# Patient Record
Sex: FEMALE | Race: BLACK OR AFRICAN AMERICAN | NOT HISPANIC OR LATINO | URBAN - METROPOLITAN AREA
[De-identification: names, ages, dates, MRNs, and addresses within clinical notes are randomized per-mention and may not be internally consistent; named-entity substitution may affect disease eponyms.]

---

## 2017-04-07 ENCOUNTER — EMERGENCY (EMERGENCY)
Facility: HOSPITAL | Age: 72
LOS: 1 days | Discharge: HOME | End: 2017-04-07
Attending: INTERNAL MEDICINE | Admitting: EMERGENCY MEDICINE
Payer: COMMERCIAL

## 2017-04-07 VITALS
HEIGHT: 63 IN | HEART RATE: 80 BPM | DIASTOLIC BLOOD PRESSURE: 76 MMHG | TEMPERATURE: 98 F | SYSTOLIC BLOOD PRESSURE: 162 MMHG | OXYGEN SATURATION: 98 % | RESPIRATION RATE: 16 BRPM | WEIGHT: 134.48 LBS

## 2017-04-07 VITALS
OXYGEN SATURATION: 99 % | RESPIRATION RATE: 20 BRPM | TEMPERATURE: 98 F | DIASTOLIC BLOOD PRESSURE: 84 MMHG | HEART RATE: 74 BPM | SYSTOLIC BLOOD PRESSURE: 142 MMHG

## 2017-04-07 PROCEDURE — 99283 EMERGENCY DEPT VISIT LOW MDM: CPT | Mod: 25

## 2017-04-07 PROCEDURE — 72040 X-RAY EXAM NECK SPINE 2-3 VW: CPT | Mod: 26

## 2017-04-07 PROCEDURE — 72040 X-RAY EXAM NECK SPINE 2-3 VW: CPT

## 2017-04-07 PROCEDURE — 99283 EMERGENCY DEPT VISIT LOW MDM: CPT

## 2017-04-07 RX ORDER — IBUPROFEN 200 MG
600 TABLET ORAL ONCE
Qty: 0 | Refills: 0 | Status: COMPLETED | OUTPATIENT
Start: 2017-04-07 | End: 2017-04-07

## 2017-04-07 RX ADMIN — Medication 600 MILLIGRAM(S): at 13:46

## 2017-04-07 RX ADMIN — Medication 600 MILLIGRAM(S): at 13:21

## 2017-04-07 NOTE — DISCHARGE NOTE ADULT - CARE PLAN
Goal:	warm/cold compress follow up with PCP  Instructions for follow-up, activity and diet:	no heavy lifting

## 2017-04-07 NOTE — ED PROVIDER NOTE - MEDICAL DECISION MAKING DETAILS
xray with advanced degenerative changes of cspine...prob radiculopathy...advised heat, nsaid and pmd f/u.may need mri in future.

## 2017-04-07 NOTE — DISCHARGE NOTE ADULT - PATIENT PORTAL LINK FT
“You can access the [facility] Patient Portal, offered by [facility], by registering with the following website: [email or web address]”

## 2017-04-07 NOTE — ED PROVIDER NOTE - DETAILS:
Mushtaq Mcgregor MD - The scribe's documentation has been prepared under my direction and personally reviewed by me in its entirety. I confirm that the note above accurately reflects all work, treatment, procedures, and medical decision making performed by me.

## 2017-04-07 NOTE — ED ADULT NURSE NOTE - FAMILY HISTORY
Father  Still living? Unknown  Family history of hypertension, Age at diagnosis: Age Unknown     Mother  Still living? Unknown  Family history of hypertension, Age at diagnosis: Age Unknown  Family history of glaucoma, Age at diagnosis: Age Unknown     Grandparent  Still living? Unknown  Family history of hypertension, Age at diagnosis: Age Unknown  Family history of glaucoma, Age at diagnosis: Age Unknown

## 2017-04-07 NOTE — ED ADULT NURSE NOTE - OBJECTIVE STATEMENT
71Y female BID by  in personal car, pt c/o "my right side neck hurts and travels to my head", pt denies any trauma, pain scale 8/10 during rest, 10/10 on excretion. pt is noted as sharp. pt has positive range/motion. no deformity's noted.

## 2017-04-07 NOTE — ED PROVIDER NOTE - OBJECTIVE STATEMENT
70 y/o F pt with PMHx of HTN, high cholesterol, chronic back pain, and headache secondary to sinus pressure presents to the ED c/o posterior neck pain, radiating to right shoulder and back x 1 week. Pt reports taking Aleve with transient relief. Denies fever, vomiting, or parasthenia. 70 y/o F pt with PMHx of HTN, high cholesterol, chronic back pain, and headache secondary to ?sinus disease presents to the ED c/o posterior neck pain, radiating to right shoulder and back x 1 week. Pt reports taking Aleve with transient relief. Denies fever, parasthesias or weakness.

## 2017-04-07 NOTE — ED PROVIDER NOTE - NS ED MD SCRIBE ATTENDING SCRIBE SECTIONS
OBSERVATION MONITORING PLAN/REVIEW OF SYSTEMS/DISPOSITION/PHYSICAL EXAM/VITAL SIGNS( Pullset)/PAST MEDICAL/SURGICAL/SOCIAL HISTORY

## 2017-04-27 ENCOUNTER — OUTPATIENT (OUTPATIENT)
Dept: OUTPATIENT SERVICES | Facility: HOSPITAL | Age: 72
LOS: 1 days | End: 2017-04-27
Payer: COMMERCIAL

## 2017-04-27 DIAGNOSIS — Z00.8 ENCOUNTER FOR OTHER GENERAL EXAMINATION: ICD-10-CM

## 2017-04-27 PROBLEM — Z00.00 ENCOUNTER FOR PREVENTIVE HEALTH EXAMINATION: Status: ACTIVE | Noted: 2017-04-27

## 2017-04-27 PROCEDURE — 70551 MRI BRAIN STEM W/O DYE: CPT

## 2017-05-05 ENCOUNTER — APPOINTMENT (OUTPATIENT)
Dept: MRI IMAGING | Facility: CLINIC | Age: 72
End: 2017-05-05

## 2017-05-20 NOTE — ED PROVIDER NOTE - NS ED MD DISPO DISCHARGE CCDA
History of renal stent  prior insertion in Oct 2014 and exchanged in   right side - Jan 17th , 2015  S/P cystoscopy  Oct 2014  Status post spinal disc removal  C-5 C-6 removal Patient/Caregiver provided printed discharge information.

## 2018-08-14 ENCOUNTER — APPOINTMENT (OUTPATIENT)
Dept: MAMMOGRAPHY | Facility: CLINIC | Age: 73
End: 2018-08-14

## 2018-08-21 PROBLEM — E78.5 HYPERLIPIDEMIA, UNSPECIFIED: Chronic | Status: ACTIVE | Noted: 2017-04-07

## 2018-08-21 PROBLEM — I10 ESSENTIAL (PRIMARY) HYPERTENSION: Chronic | Status: ACTIVE | Noted: 2017-04-07

## 2018-08-21 PROBLEM — M54.9 DORSALGIA, UNSPECIFIED: Chronic | Status: ACTIVE | Noted: 2017-04-07

## 2018-08-30 ENCOUNTER — RESULT REVIEW (OUTPATIENT)
Age: 73
End: 2018-08-30

## 2018-08-30 ENCOUNTER — OUTPATIENT (OUTPATIENT)
Dept: OUTPATIENT SERVICES | Facility: HOSPITAL | Age: 73
LOS: 1 days | End: 2018-08-30
Payer: COMMERCIAL

## 2018-08-30 ENCOUNTER — APPOINTMENT (OUTPATIENT)
Dept: MAMMOGRAPHY | Facility: CLINIC | Age: 73
End: 2018-08-30
Payer: SELF-PAY

## 2018-08-30 DIAGNOSIS — Z00.8 ENCOUNTER FOR OTHER GENERAL EXAMINATION: ICD-10-CM

## 2018-08-30 PROCEDURE — A4648: CPT

## 2018-08-30 PROCEDURE — 77065 DX MAMMO INCL CAD UNI: CPT

## 2018-08-30 PROCEDURE — 19081 BX BREAST 1ST LESION STRTCTC: CPT | Mod: RT

## 2018-08-30 PROCEDURE — 19081 BX BREAST 1ST LESION STRTCTC: CPT

## 2018-08-30 PROCEDURE — 77065 DX MAMMO INCL CAD UNI: CPT | Mod: 26,RT

## 2019-09-18 ENCOUNTER — APPOINTMENT (OUTPATIENT)
Dept: UROLOGY | Facility: CLINIC | Age: 74
End: 2019-09-18
Payer: MEDICARE

## 2019-09-18 VITALS
RESPIRATION RATE: 13 BRPM | OXYGEN SATURATION: 89 % | DIASTOLIC BLOOD PRESSURE: 90 MMHG | BODY MASS INDEX: 23.04 KG/M2 | SYSTOLIC BLOOD PRESSURE: 150 MMHG | WEIGHT: 130 LBS | HEIGHT: 63 IN | HEART RATE: 69 BPM

## 2019-09-18 DIAGNOSIS — Z86.79 PERSONAL HISTORY OF OTHER DISEASES OF THE CIRCULATORY SYSTEM: ICD-10-CM

## 2019-09-18 DIAGNOSIS — Z78.9 OTHER SPECIFIED HEALTH STATUS: ICD-10-CM

## 2019-09-18 DIAGNOSIS — Z86.39 PERSONAL HISTORY OF OTHER ENDOCRINE, NUTRITIONAL AND METABOLIC DISEASE: ICD-10-CM

## 2019-09-18 PROCEDURE — 99203 OFFICE O/P NEW LOW 30 MIN: CPT

## 2019-09-18 RX ORDER — IRON/IRON ASP GLY/FA/MV-MIN 38 125-25-1MG
TABLET ORAL
Refills: 0 | Status: ACTIVE | COMMUNITY

## 2019-09-18 RX ORDER — CALCIUM CARBONATE/VITAMIN D3 600 MG-10
TABLET ORAL
Refills: 0 | Status: ACTIVE | COMMUNITY

## 2019-09-18 RX ORDER — LISINOPRIL 30 MG/1
TABLET ORAL
Refills: 0 | Status: ACTIVE | COMMUNITY

## 2019-09-18 RX ORDER — LOSARTAN POTASSIUM 100 MG/1
TABLET, FILM COATED ORAL
Refills: 0 | Status: ACTIVE | COMMUNITY

## 2019-09-18 NOTE — PHYSICAL EXAM
[General Appearance - Well Nourished] : well nourished [General Appearance - Well Developed] : well developed [Normal Appearance] : normal appearance [Well Groomed] : well groomed [Edema] : no peripheral edema [General Appearance - In No Acute Distress] : no acute distress [Respiration, Rhythm And Depth] : normal respiratory rhythm and effort [Abdomen Soft] : soft [Abdomen Tenderness] : non-tender [Exaggerated Use Of Accessory Muscles For Inspiration] : no accessory muscle use [Normal Station and Gait] : the gait and station were normal for the patient's age [Costovertebral Angle Tenderness] : no ~M costovertebral angle tenderness [] : no rash [No Focal Deficits] : no focal deficits [Oriented To Time, Place, And Person] : oriented to person, place, and time [Affect] : the affect was normal [Mood] : the mood was normal [Not Anxious] : not anxious [Inguinal Lymph Nodes Enlarged Bilaterally] : inguinal

## 2019-09-18 NOTE — REVIEW OF SYSTEMS
[Feeling Tired] : feeling tired [Dry Eyes] : dryness of the eyes [Date of last menstrual period ____] : date of last menstrual period: [unfilled] [Joint Pain] : joint pain [Wake up at night to urinate  How many times?  ___] : wakes up to urinate [unfilled] times during the night [Hot Flashes] : hot flashes [Negative] : Psychiatric

## 2019-09-20 NOTE — ASSESSMENT
[FreeTextEntry1] : Difference between simple and complex renal cyst were discussed. Pathophysiology of angiomyolipoma was discussed. For better evaluation, CT scan with contrast was recommended which will be ordered, pending insurance approval. She can call me after CT scan is done to review the results.\par (Addendum: her insurance approved MRI of the abdomen)\par \par Raymon Mueller MD, FACS\par [facility]\par  of Urology\par \par [address], [address]\[place] [address]\par \par [address], [address]\[place] [address]\par \par Tel: [phone]\par Fax: [phone]

## 2019-09-20 NOTE — HISTORY OF PRESENT ILLNESS
[FreeTextEntry1] : She is a 73-year-old woman who is seen today for initial visit. According to the patient, renal ultrasound done for anemia workup showed renal cysts. She is here to discuss this. She does not have significant urinary symptoms, hematuria or flank pain. She is a nonsmoker. Creatinine was 1.06. Ultrasound from [facility] radiology showed 3 simple left renal cyst up to to 3 cm and small right renal angiomyolipoma and at least 2 septated right renal cysts up to 4 cm.

## 2019-09-20 NOTE — LETTER BODY
[Dear  ___] : Dear  [unfilled], [Consult Letter:] : I had the pleasure of evaluating your patient, [unfilled]. [Consult Closing:] : Thank you very much for allowing me to participate in the care of this patient.  If you have any questions, please do not hesitate to contact me. [FreeTextEntry1] : [facility]\par \par \par Address: [address] [place] [address]\par \par Phone: [phone]

## 2019-10-02 ENCOUNTER — MESSAGE (OUTPATIENT)
Age: 74
End: 2019-10-02

## 2019-10-21 ENCOUNTER — APPOINTMENT (OUTPATIENT)
Dept: UROLOGY | Facility: CLINIC | Age: 74
End: 2019-10-21
Payer: MEDICARE

## 2019-10-21 VITALS
DIASTOLIC BLOOD PRESSURE: 80 MMHG | OXYGEN SATURATION: 95 % | BODY MASS INDEX: 23.04 KG/M2 | RESPIRATION RATE: 13 BRPM | WEIGHT: 130 LBS | SYSTOLIC BLOOD PRESSURE: 144 MMHG | HEART RATE: 68 BPM | HEIGHT: 63 IN

## 2019-10-21 PROCEDURE — 99213 OFFICE O/P EST LOW 20 MIN: CPT

## 2019-10-21 NOTE — LETTER BODY
[Dear  ___] : Dear  [unfilled], [Consult Letter:] : I had the pleasure of evaluating your patient, [unfilled]. [FreeTextEntry1] : [facility]\par \par \par Address: [address] [place] [address]\par \par Phone: [phone] [Consult Closing:] : Thank you very much for allowing me to participate in the care of this patient.  If you have any questions, please do not hesitate to contact me.

## 2019-10-21 NOTE — PHYSICAL EXAM
[Normal Appearance] : normal appearance [General Appearance - Well Nourished] : well nourished [General Appearance - Well Developed] : well developed [General Appearance - In No Acute Distress] : no acute distress [Well Groomed] : well groomed [Abdomen Tenderness] : non-tender [Abdomen Soft] : soft [Costovertebral Angle Tenderness] : no ~M costovertebral angle tenderness [] : no respiratory distress [Respiration, Rhythm And Depth] : normal respiratory rhythm and effort [Oriented To Time, Place, And Person] : oriented to person, place, and time [Exaggerated Use Of Accessory Muscles For Inspiration] : no accessory muscle use [Mood] : the mood was normal [Affect] : the affect was normal [Not Anxious] : not anxious

## 2019-10-21 NOTE — HISTORY OF PRESENT ILLNESS
[FreeTextEntry1] : She is a 74-year-old woman who is seen today in follow up. In followup to renal ultrasound findings, she underwent an MRI of the abdomen in September 2019. In addition to renal cysts, it showed a left renal 1 cm enhancing lesion suspicious for malignancy. She is here today with her daughter who is a nurse to discuss.\par Previous notes: According to the patient, renal ultrasound done for anemia workup showed renal cysts. She does not have significant urinary symptoms, hematuria or flank pain. She is a nonsmoker. Creatinine was 1.06. Ultrasound from [facility] radiology showed 3 simple left renal cyst up to to 3 cm and small right renal angiomyolipoma and at least 2 septated right renal cysts up to 4 cm.

## 2019-10-21 NOTE — ASSESSMENT
[FreeTextEntry1] : A copy of the MRI was given to patient. He she has a small incidentally found renal mass. Generally small incidental renal masses grow slowly and observation with imaging every 6 months is recommended. Also possibility of benign renal lesion was discussed. Alternatively partial nephrectomy and minimally invasive treatments by interventional radiology were discussed. Questions and concerns were answered. For now, she will follow with observation in about 6 months with repeat imaging such as ultrasound.\par \par Raymon Mueller MD, FACS\par [facility]\par  of Urology\par \par [address], [address]\[place] [address]\par \par [address], [address]\[place] [address]\par \par Tel: [phone]\par Fax: [phone]

## 2020-06-25 ENCOUNTER — APPOINTMENT (OUTPATIENT)
Dept: UROLOGY | Facility: CLINIC | Age: 75
End: 2020-06-25
Payer: MEDICARE

## 2020-06-25 VITALS
RESPIRATION RATE: 15 BRPM | TEMPERATURE: 98.4 F | HEIGHT: 63 IN | BODY MASS INDEX: 23.04 KG/M2 | SYSTOLIC BLOOD PRESSURE: 178 MMHG | OXYGEN SATURATION: 98 % | WEIGHT: 130 LBS | HEART RATE: 86 BPM | DIASTOLIC BLOOD PRESSURE: 79 MMHG

## 2020-06-25 PROCEDURE — 99213 OFFICE O/P EST LOW 20 MIN: CPT

## 2020-06-25 NOTE — ASSESSMENT
[FreeTextEntry1] : Again, we discussed options for incidentally found small renal masses. Continued observation with repeat imaging, minimally invasive treatments by interventional radiology or partial nephrectomy was discussed. She will proceed with an ultrasound first and then we will decide the next step based on the results.\par \par Raymon Mueller MD, FACS\par [facility]\par  of Urology\par \par [address], [address]\[place] [address]\par \par [address], [address]\[place] [address]\par \par Tel: [phone]\par Fax: [phone]

## 2020-06-25 NOTE — PHYSICAL EXAM
[General Appearance - Well Developed] : well developed [General Appearance - Well Nourished] : well nourished [Normal Appearance] : normal appearance [Well Groomed] : well groomed [General Appearance - In No Acute Distress] : no acute distress [Abdomen Soft] : soft [Abdomen Tenderness] : non-tender [Costovertebral Angle Tenderness] : no ~M costovertebral angle tenderness [FreeTextEntry1] : Bladder not distended.

## 2020-06-25 NOTE — LETTER BODY
[Dear  ___] : Dear  [unfilled], [Consult Letter:] : I had the pleasure of evaluating your patient, [unfilled]. [Consult Closing:] : Thank you very much for allowing me to participate in the care of this patient.  If you have any questions, please do not hesitate to contact me. [FreeTextEntry1] : [facility]\par \par \par Address: [address] [place] [address]\par \par Phone: [phone]

## 2020-06-25 NOTE — HISTORY OF PRESENT ILLNESS
[FreeTextEntry1] : She is a 74-year-old woman who is seen today in follow-up for renal mass on observation. She has no flank pain, hematuria or dysuria. There is no weight loss or bone pain. MRI of the abdomen in September 2019 showed renal cysts, left renal 1 cm enhancing lesion suspicious for malignancy. \par \par Previous notes: According to the patient, renal ultrasound done for anemia workup showed renal cysts.  She is a nonsmoker. Creatinine was 1.06. Ultrasound from [facility] radiology showed 3 simple left renal cyst up to to 3 cm and small right renal angiomyolipoma and at least 2 septated right renal cysts up to 4 cm.

## 2020-12-28 ENCOUNTER — APPOINTMENT (OUTPATIENT)
Dept: UROLOGY | Facility: CLINIC | Age: 75
End: 2020-12-28
Payer: MEDICARE

## 2020-12-28 VITALS
SYSTOLIC BLOOD PRESSURE: 162 MMHG | BODY MASS INDEX: 23.04 KG/M2 | WEIGHT: 130 LBS | HEART RATE: 80 BPM | RESPIRATION RATE: 15 BRPM | TEMPERATURE: 98.3 F | DIASTOLIC BLOOD PRESSURE: 94 MMHG | HEIGHT: 63 IN | OXYGEN SATURATION: 96 %

## 2020-12-28 PROCEDURE — 99072 ADDL SUPL MATRL&STAF TM PHE: CPT

## 2020-12-28 PROCEDURE — 99213 OFFICE O/P EST LOW 20 MIN: CPT

## 2020-12-28 RX ORDER — AMLODIPINE BESYLATE 5 MG/1
5 TABLET ORAL
Qty: 90 | Refills: 0 | Status: ACTIVE | COMMUNITY
Start: 2020-10-01

## 2020-12-28 RX ORDER — LISINOPRIL AND HYDROCHLOROTHIAZIDE TABLETS 20; 12.5 MG/1; MG/1
20-12.5 TABLET ORAL
Qty: 90 | Refills: 0 | Status: ACTIVE | COMMUNITY
Start: 2020-05-21

## 2020-12-28 RX ORDER — METOPROLOL TARTRATE 50 MG/1
50 TABLET, FILM COATED ORAL
Qty: 90 | Refills: 0 | Status: ACTIVE | COMMUNITY
Start: 2020-05-21

## 2020-12-28 NOTE — HISTORY OF PRESENT ILLNESS
[FreeTextEntry1] : She is a 75 year-old woman who is seen today in follow-up for renal mass on observation.  She does not have any new symptoms.  Nocturia is 2 times.  There is no flank pain.  There is no weight loss or bone pain.  She has not repeated her renal ultrasound yet.  MRI of the abdomen in September 2019 showed renal cysts, left renal 1 cm enhancing lesion suspicious for malignancy. \par \par Previous notes: According to the patient, renal ultrasound done for anemia workup showed renal cysts.  She is a nonsmoker. Creatinine was 1.06. Ultrasound from [facility] radiology showed 3 simple left renal cyst up to to 3 cm and small right renal angiomyolipoma and at least 2 septated right renal cysts up to 4 cm.

## 2020-12-28 NOTE — LETTER BODY
[Dear  ___] : Dear  [unfilled], [Consult Letter:] : I had the pleasure of evaluating your patient, [unfilled]. [Consult Closing:] : Thank you very much for allowing me to participate in the care of this patient.  If you have any questions, please do not hesitate to contact me. [FreeTextEntry1] : [facility]\par \par Address: [address] [place] [address]

## 2020-12-28 NOTE — ASSESSMENT
[FreeTextEntry1] : She does not have any new urologic symptoms.  Pathophysiology and follow-up protocol for renal mass on observation was discussed.  She will undergo renal ultrasound and call me for the results.  Depending on the results, she can follow-up in 6 to 9 months.\par \par Raymon Mueller MD, FACS\par [facility]\par  of Urology\par \par [address], [address]\[place] [address]\par \par [address], [address]\[place] [address]\par \par Tel: [phone]\par Fax: [phone]

## 2021-01-11 ENCOUNTER — NON-APPOINTMENT (OUTPATIENT)
Age: 76
End: 2021-01-11

## 2021-09-29 ENCOUNTER — APPOINTMENT (OUTPATIENT)
Dept: UROLOGY | Facility: CLINIC | Age: 76
End: 2021-09-29
Payer: MEDICARE

## 2021-09-29 VITALS
BODY MASS INDEX: 22.86 KG/M2 | WEIGHT: 129 LBS | OXYGEN SATURATION: 96 % | DIASTOLIC BLOOD PRESSURE: 78 MMHG | SYSTOLIC BLOOD PRESSURE: 199 MMHG | RESPIRATION RATE: 18 BRPM | HEIGHT: 63 IN | HEART RATE: 64 BPM | TEMPERATURE: 97.2 F

## 2021-09-29 DIAGNOSIS — N28.1 CYST OF KIDNEY, ACQUIRED: ICD-10-CM

## 2021-09-29 DIAGNOSIS — N28.89 OTHER SPECIFIED DISORDERS OF KIDNEY AND URETER: ICD-10-CM

## 2021-09-29 PROCEDURE — 99213 OFFICE O/P EST LOW 20 MIN: CPT

## 2021-09-29 NOTE — LETTER BODY
[Dear  ___] : Dear  [unfilled], [Consult Letter:] : I had the pleasure of evaluating your patient, [unfilled]. [Consult Closing:] : Thank you very much for allowing me to participate in the care of this patient.  If you have any questions, please do not hesitate to contact me. [FreeTextEntry1] : [facility]\par \par Address: [address] [place] [address]

## 2021-09-29 NOTE — HISTORY OF PRESENT ILLNESS
[FreeTextEntry1] : She is a 75 year-old woman who is seen today in follow-up for renal mass on observation.  There is no gross hematuria.  There is no flank pain.  There is no dysuria.  Ultrasound from radiology in January 2021 showed renal cyst on the right side up to 4 cm with septated parapelvic cyst and a possibly a 9 mm right angiomyolipoma.  There were left renal cysts up to 4.3 cm.  Small left renal mass which was seen previously on MRI was not seen.\par \par MRI of the abdomen in September 2019 showed renal cysts, left renal 1 cm enhancing lesion suspicious for malignancy. \par \par Previous notes: According to the patient, renal ultrasound done for anemia workup showed renal cysts.  She is a nonsmoker. Creatinine was 1.06. Ultrasound from [facility] radiology showed 3 simple left renal cyst up to to 3 cm and small right renal angiomyolipoma and at least 2 septated right renal cysts up to 4 cm.

## 2021-09-29 NOTE — ASSESSMENT
[FreeTextEntry1] : From urologic standpoint she is asymptomatic.  Ultrasound did not show the renal mass.  Renal mass was quite small however on MRI.  Again the difference between simple and complex renal cysts were discussed.  Pending insurance approval, she will undergo MRI of the abdomen and call me for the results.  If it remains unchanged, she can follow-up in 6 months to 1 year.\par \par Raymon Mueller MD, FACS\par [facility]\par  of Urology\par \par [address]\[place] [address]\par \par [address]\[place] [address]\par \par Tel: [phone]\par Fax: [phone]

## 2021-09-29 NOTE — PHYSICAL EXAM
[General Appearance - Well Developed] : well developed [General Appearance - Well Nourished] : well nourished [Normal Appearance] : normal appearance [Well Groomed] : well groomed [General Appearance - In No Acute Distress] : no acute distress [Abdomen Soft] : soft [Abdomen Tenderness] : non-tender [Costovertebral Angle Tenderness] : no ~M costovertebral angle tenderness [FreeTextEntry1] : Bladder not distended. [] : no respiratory distress [Respiration, Rhythm And Depth] : normal respiratory rhythm and effort [Exaggerated Use Of Accessory Muscles For Inspiration] : no accessory muscle use

## 2021-10-13 ENCOUNTER — NON-APPOINTMENT (OUTPATIENT)
Age: 76
End: 2021-10-13

## 2022-09-07 NOTE — ED ADULT NURSE NOTE - SEPSIS SCREEN SUSPECTED INFECTION, MLM
No
no point vertebral point tenderness + pain to left neck on movement/no deformity, pain or tenderness. no restriction of movement/supple/PAIN ON MOVEMENT/trachea midline

## 2022-09-29 ENCOUNTER — APPOINTMENT (OUTPATIENT)
Dept: UROLOGY | Facility: CLINIC | Age: 77
End: 2022-09-29

## 2024-08-09 NOTE — ED ADULT NURSE NOTE - PAIN: RADIATION
RN staff notified CM that pt has dc order and that ambulance is on will call for . [facility] notified and able to schedule  at 10:15pm. Liaison Kali at [facility] and family agreeable to 10:15pick up for respite stay.    Plan: [facility] for respite stay today.    ODILIA Singh    [phone]    head

## 2024-11-11 ENCOUNTER — APPOINTMENT (OUTPATIENT)
Dept: UROLOGY | Facility: CLINIC | Age: 79
End: 2024-11-11
Payer: MEDICARE

## 2024-11-11 VITALS
HEIGHT: 63 IN | BODY MASS INDEX: 22.86 KG/M2 | DIASTOLIC BLOOD PRESSURE: 76 MMHG | RESPIRATION RATE: 16 BRPM | SYSTOLIC BLOOD PRESSURE: 170 MMHG | HEART RATE: 102 BPM | WEIGHT: 129 LBS | OXYGEN SATURATION: 98 %

## 2024-11-11 DIAGNOSIS — N28.1 CYST OF KIDNEY, ACQUIRED: ICD-10-CM

## 2024-11-11 DIAGNOSIS — N28.89 OTHER SPECIFIED DISORDERS OF KIDNEY AND URETER: ICD-10-CM

## 2024-11-11 PROCEDURE — G2211 COMPLEX E/M VISIT ADD ON: CPT

## 2024-11-11 PROCEDURE — 99203 OFFICE O/P NEW LOW 30 MIN: CPT

## 2024-11-21 ENCOUNTER — APPOINTMENT (OUTPATIENT)
Dept: MRI IMAGING | Facility: CLINIC | Age: 79
End: 2024-11-21

## 2024-11-21 ENCOUNTER — OUTPATIENT (OUTPATIENT)
Dept: OUTPATIENT SERVICES | Facility: HOSPITAL | Age: 79
LOS: 1 days | End: 2024-11-21
Payer: MEDICARE

## 2024-11-21 DIAGNOSIS — N28.89 OTHER SPECIFIED DISORDERS OF KIDNEY AND URETER: ICD-10-CM

## 2024-11-21 PROCEDURE — 74183 MRI ABD W/O CNTR FLWD CNTR: CPT | Mod: 26

## 2024-11-21 PROCEDURE — A9585: CPT

## 2024-11-21 PROCEDURE — 74183 MRI ABD W/O CNTR FLWD CNTR: CPT

## 2024-12-10 ENCOUNTER — APPOINTMENT (OUTPATIENT)
Dept: UROLOGY | Facility: CLINIC | Age: 79
End: 2024-12-10
Payer: MEDICARE

## 2024-12-10 DIAGNOSIS — N28.89 OTHER SPECIFIED DISORDERS OF KIDNEY AND URETER: ICD-10-CM

## 2024-12-10 DIAGNOSIS — N28.1 CYST OF KIDNEY, ACQUIRED: ICD-10-CM

## 2024-12-10 PROCEDURE — 99441: CPT
